# Patient Record
Sex: MALE | Race: WHITE | NOT HISPANIC OR LATINO | Employment: FULL TIME | URBAN - METROPOLITAN AREA
[De-identification: names, ages, dates, MRNs, and addresses within clinical notes are randomized per-mention and may not be internally consistent; named-entity substitution may affect disease eponyms.]

---

## 2024-09-23 PROCEDURE — 93005 ELECTROCARDIOGRAM TRACING: CPT

## 2024-09-23 PROCEDURE — 99285 EMERGENCY DEPT VISIT HI MDM: CPT | Mod: 25

## 2024-09-24 ENCOUNTER — HOSPITAL ENCOUNTER (EMERGENCY)
Facility: CLINIC | Age: 19
Discharge: HOME | End: 2024-09-24
Attending: EMERGENCY MEDICINE | Admitting: EMERGENCY MEDICINE
Payer: COMMERCIAL

## 2024-09-24 ENCOUNTER — APPOINTMENT (OUTPATIENT)
Dept: GENERAL RADIOLOGY | Facility: CLINIC | Age: 19
End: 2024-09-24
Attending: EMERGENCY MEDICINE
Payer: COMMERCIAL

## 2024-09-24 ENCOUNTER — APPOINTMENT (OUTPATIENT)
Dept: CT IMAGING | Facility: CLINIC | Age: 19
End: 2024-09-24
Attending: EMERGENCY MEDICINE
Payer: COMMERCIAL

## 2024-09-24 VITALS
RESPIRATION RATE: 18 BRPM | TEMPERATURE: 97.9 F | HEART RATE: 78 BPM | OXYGEN SATURATION: 98 % | WEIGHT: 171.08 LBS | DIASTOLIC BLOOD PRESSURE: 67 MMHG | SYSTOLIC BLOOD PRESSURE: 125 MMHG

## 2024-09-24 DIAGNOSIS — R07.9 ACUTE CHEST PAIN: ICD-10-CM

## 2024-09-24 DIAGNOSIS — I45.10 INCOMPLETE RBBB: ICD-10-CM

## 2024-09-24 LAB
ANION GAP SERPL CALCULATED.3IONS-SCNC: 13 MMOL/L (ref 7–15)
ATRIAL RATE - MUSE: 86 BPM
BASOPHILS # BLD AUTO: 0.1 10E3/UL (ref 0–0.2)
BASOPHILS NFR BLD AUTO: 1 %
BUN SERPL-MCNC: 13.2 MG/DL (ref 6–20)
CALCIUM SERPL-MCNC: 9.3 MG/DL (ref 8.8–10.4)
CHLORIDE SERPL-SCNC: 100 MMOL/L (ref 98–107)
CREAT SERPL-MCNC: 0.77 MG/DL (ref 0.67–1.17)
D DIMER PPP FEU-MCNC: 0.56 UG/ML FEU (ref 0–0.5)
DIASTOLIC BLOOD PRESSURE - MUSE: NORMAL MMHG
EGFRCR SERPLBLD CKD-EPI 2021: >90 ML/MIN/1.73M2
EOSINOPHIL # BLD AUTO: 0.1 10E3/UL (ref 0–0.7)
EOSINOPHIL NFR BLD AUTO: 1 %
ERYTHROCYTE [DISTWIDTH] IN BLOOD BY AUTOMATED COUNT: 13.5 % (ref 10–15)
GLUCOSE SERPL-MCNC: 106 MG/DL (ref 70–99)
HCO3 SERPL-SCNC: 23 MMOL/L (ref 22–29)
HCT VFR BLD AUTO: 43.4 % (ref 40–53)
HGB BLD-MCNC: 13.9 G/DL (ref 13.3–17.7)
HOLD SPECIMEN: NORMAL
HOLD SPECIMEN: NORMAL
IMM GRANULOCYTES # BLD: 0 10E3/UL
IMM GRANULOCYTES NFR BLD: 0 %
INTERPRETATION ECG - MUSE: NORMAL
LYMPHOCYTES # BLD AUTO: 3.1 10E3/UL (ref 0.8–5.3)
LYMPHOCYTES NFR BLD AUTO: 35 %
MCH RBC QN AUTO: 26.2 PG (ref 26.5–33)
MCHC RBC AUTO-ENTMCNC: 32 G/DL (ref 31.5–36.5)
MCV RBC AUTO: 82 FL (ref 78–100)
MONOCYTES # BLD AUTO: 0.6 10E3/UL (ref 0–1.3)
MONOCYTES NFR BLD AUTO: 7 %
NEUTROPHILS # BLD AUTO: 5 10E3/UL (ref 1.6–8.3)
NEUTROPHILS NFR BLD AUTO: 56 %
NRBC # BLD AUTO: 0 10E3/UL
NRBC BLD AUTO-RTO: 0 /100
P AXIS - MUSE: 63 DEGREES
PLATELET # BLD AUTO: 321 10E3/UL (ref 150–450)
POTASSIUM SERPL-SCNC: 3.8 MMOL/L (ref 3.4–5.3)
PR INTERVAL - MUSE: 148 MS
QRS DURATION - MUSE: 98 MS
QT - MUSE: 348 MS
QTC - MUSE: 416 MS
R AXIS - MUSE: 84 DEGREES
RBC # BLD AUTO: 5.31 10E6/UL (ref 4.4–5.9)
SODIUM SERPL-SCNC: 136 MMOL/L (ref 135–145)
SYSTOLIC BLOOD PRESSURE - MUSE: NORMAL MMHG
T AXIS - MUSE: 52 DEGREES
TROPONIN T SERPL HS-MCNC: <6 NG/L
VENTRICULAR RATE- MUSE: 86 BPM
WBC # BLD AUTO: 8.9 10E3/UL (ref 4–11)

## 2024-09-24 PROCEDURE — 250N000011 HC RX IP 250 OP 636: Performed by: EMERGENCY MEDICINE

## 2024-09-24 PROCEDURE — 80048 BASIC METABOLIC PNL TOTAL CA: CPT | Performed by: STUDENT IN AN ORGANIZED HEALTH CARE EDUCATION/TRAINING PROGRAM

## 2024-09-24 PROCEDURE — 71275 CT ANGIOGRAPHY CHEST: CPT

## 2024-09-24 PROCEDURE — 250N000013 HC RX MED GY IP 250 OP 250 PS 637: Performed by: EMERGENCY MEDICINE

## 2024-09-24 PROCEDURE — 85025 COMPLETE CBC W/AUTO DIFF WBC: CPT | Performed by: STUDENT IN AN ORGANIZED HEALTH CARE EDUCATION/TRAINING PROGRAM

## 2024-09-24 PROCEDURE — 84484 ASSAY OF TROPONIN QUANT: CPT | Performed by: STUDENT IN AN ORGANIZED HEALTH CARE EDUCATION/TRAINING PROGRAM

## 2024-09-24 PROCEDURE — 36415 COLL VENOUS BLD VENIPUNCTURE: CPT | Performed by: STUDENT IN AN ORGANIZED HEALTH CARE EDUCATION/TRAINING PROGRAM

## 2024-09-24 PROCEDURE — 85379 FIBRIN DEGRADATION QUANT: CPT | Performed by: EMERGENCY MEDICINE

## 2024-09-24 PROCEDURE — 71046 X-RAY EXAM CHEST 2 VIEWS: CPT

## 2024-09-24 RX ORDER — IBUPROFEN 600 MG/1
600 TABLET, FILM COATED ORAL ONCE
Status: COMPLETED | OUTPATIENT
Start: 2024-09-24 | End: 2024-09-24

## 2024-09-24 RX ORDER — IOPAMIDOL 755 MG/ML
61 INJECTION, SOLUTION INTRAVASCULAR ONCE
Status: COMPLETED | OUTPATIENT
Start: 2024-09-24 | End: 2024-09-24

## 2024-09-24 RX ADMIN — IOPAMIDOL 61 ML: 755 INJECTION, SOLUTION INTRAVENOUS at 06:05

## 2024-09-24 RX ADMIN — IBUPROFEN 600 MG: 600 TABLET, FILM COATED ORAL at 05:52

## 2024-09-24 ASSESSMENT — ACTIVITIES OF DAILY LIVING (ADL)
ADLS_ACUITY_SCORE: 35
ADLS_ACUITY_SCORE: 33
ADLS_ACUITY_SCORE: 35

## 2024-09-24 ASSESSMENT — COLUMBIA-SUICIDE SEVERITY RATING SCALE - C-SSRS
1. IN THE PAST MONTH, HAVE YOU WISHED YOU WERE DEAD OR WISHED YOU COULD GO TO SLEEP AND NOT WAKE UP?: NO
6. HAVE YOU EVER DONE ANYTHING, STARTED TO DO ANYTHING, OR PREPARED TO DO ANYTHING TO END YOUR LIFE?: NO
2. HAVE YOU ACTUALLY HAD ANY THOUGHTS OF KILLING YOURSELF IN THE PAST MONTH?: NO

## 2024-09-24 NOTE — ED TRIAGE NOTES
Patient states that he's been having chest pain since about 11pm along with SOB. Denies any trauma, or excessive caffeine intake.States pain is worse with exacerbation. ABCs intact. VSS.

## 2024-09-24 NOTE — ED PROVIDER NOTES
Emergency Department Note      History of Present Illness     Chief Complaint  Chest Pain    EVELYN Somers is a 19 year old male who presents to the ED with his partner for evaluation of chest pain. He reports upper left sided chest pain since last night when he was working along with shortness of breath.  Patient works laying asphalt, and after using a shovel a couple of times, noted the onset of left-sided chest discomfort.  This prompted concern and decision to present to the ED for further evaluation.  He denies ever experiencing pain similar to this in the past.  He denies any associated prior cardiac history nor any family history of premature cardiac disease or sudden cardiac death.  Is no underlying history of high blood pressure, high cholesterol or diabetes.  No history of drug use.  No prior history of DVT nor PE.  Symptoms do not change based on how he is sitting or laying.    Independent Historian  None    Review of External Notes  None  Past Medical History   Medical History and Problem List  ADHD  Varicocele     Medications  Zanaflex   Accutane   Physical Exam   Patient Vitals for the past 24 hrs:   BP Temp Temp src Pulse Resp SpO2 Weight   09/24/24 0415 125/67 -- -- 78 -- 98 % --   09/24/24 0005 (!) 143/84 97.9  F (36.6  C) Temporal 101 18 100 % 77.6 kg (171 lb 1.2 oz)     Physical Exam  General:              Well-nourished              Speaking in full sentences  Eyes:              Conjunctiva without injection or scleral icterus  ENT:              Moist mucous membranes              Nares patent              Pinnae normal  Neck:              Full ROM              No stiffness appreciated  Resp:              Lungs CTAB              No crackles, wheezing or audible rubs              Good air movement  CV:                    Normal rate, regular rhythm              S1 and S2 present              No murmur, gallop or rub  GI:              BS present              Abdomen soft without  distention              Non-tender to light and deep palpation              No guarding or rebound tenderness  Skin:              Warm, dry, well perfused              No rashes or open wounds on exposed skin  MSK:              Moves all extremities              Tenderness to palpation over anterior chest wall              Extension of the shoulders exacerbates pain              Performing a shoveling movement exacerbates his pain  Neuro:              Alert              Answers questions appropriately              Moves all extremities equally              Gait stable  Psych:              Normal affect, normal mood    Diagnostics   Lab Results   Labs Ordered and Resulted from Time of ED Arrival to Time of ED Departure   BASIC METABOLIC PANEL - Abnormal       Result Value    Sodium 136      Potassium 3.8      Chloride 100      Carbon Dioxide (CO2) 23      Anion Gap 13      Urea Nitrogen 13.2      Creatinine 0.77      GFR Estimate >90      Calcium 9.3      Glucose 106 (*)    CBC WITH PLATELETS AND DIFFERENTIAL - Abnormal    WBC Count 8.9      RBC Count 5.31      Hemoglobin 13.9      Hematocrit 43.4      MCV 82      MCH 26.2 (*)     MCHC 32.0      RDW 13.5      Platelet Count 321      % Neutrophils 56      % Lymphocytes 35      % Monocytes 7      % Eosinophils 1      % Basophils 1      % Immature Granulocytes 0      NRBCs per 100 WBC 0      Absolute Neutrophils 5.0      Absolute Lymphocytes 3.1      Absolute Monocytes 0.6      Absolute Eosinophils 0.1      Absolute Basophils 0.1      Absolute Immature Granulocytes 0.0      Absolute NRBCs 0.0     D DIMER QUANTITATIVE - Abnormal    D-Dimer Quantitative 0.56 (*)    TROPONIN T, HIGH SENSITIVITY - Normal    Troponin T, High Sensitivity <6       Imaging  CT Chest Pulmonary Embolism w Contrast   Final Result   IMPRESSION:   1.  No pulmonary embolism.      Chest XR,  PA & LAT   Final Result   IMPRESSION: Negative chest.        Report per radiology    EKG   ECG results from  09/24/24   EKG 12 lead     Value    Systolic Blood Pressure     Diastolic Blood Pressure     Ventricular Rate 86    Atrial Rate 86    HI Interval 148    QRS Duration 98        QTc 416    P Axis 63    R AXIS 84    T Axis 52    Interpretation ECG      Sinus rhythm  Incomplete right bundle branch block  Borderline ECG  No previous ECGs available  Read at 0529       Independent Interpretation  CXR: No pneumonia or pneumothorax.  ED Course    Medications Administered  Medications   ibuprofen (ADVIL/MOTRIN) tablet 600 mg (600 mg Oral $Given 9/24/24 0552)   iopamidol (ISOVUE-370) solution 61 mL (61 mLs Intravenous $Given 9/24/24 0605)   sodium chloride (PF) 0.9% PF flush 80 mL (80 mLs Intravenous $Given 9/24/24 0607)     Procedures  Procedures     Discussion of Management  None    Optional/Additional Documentation  None    ED Course  ED Course as of 09/24/24 0712   Tue Sep 24, 2024   0507 I obtained history and examined the patient as noted above.    0536 I rechecked the patient and explained findings.   0628 Patient re-checked.     Medical Decision Making / Diagnosis   CMS Diagnoses: None    MIPS  CT for PE was ordered because the patient had an abnormal d-dimer.    LIZETTE Somers is a 19-year-old male presenting to the ER for evaluation of chest pain.  VS on presentation reveal mild tachycardia though otherwise are unremarkable.  Differential diagnosis includes ACS, PE, pneumothorax, pneumonia, aortic dissection, musculoskeletal pain, among others.  History, exam, and ED course as noted above.  Certainly, musculoskeletal etiologies on the differential as patient noted the onset of pain while shoveling.  He had also has tenderness to palpation over his chest wall as well as exacerbation of symptoms with extension of his shoulders.  EKG was pursued, demonstrating sinus rhythm, incomplete right bundle branch block, though no acute ST segment elevation or depression.  No previous EKGs are available for comparison.   I discussed this incidental finding with patient and he may review this further with his PCP and consider outpatient echocardiogram, though do not feel this needs to be done emergently from the ER.  Given his shortness of breath, PE considered, D-dimer did return mildly elevated for which CT pursued.  CT chest fortunately negative for PE, pneumothorax, pneumonia, or acute aortic abnormality.  His high-sensitivity troponin has also returned undetectable and given the above history and evaluation, low suspicion for ACS.  Results and clinical impression discussed with patient.  With reasonable clinical certainty I feel he can safely be discharged from the ER with close outpatient follow-up.  I recommended follow-up with primary care provider in 2 to 3 days for recheck.  Return to ER with worsening pain, shortness of breath, fevers, chills or any other concerns.    Disposition  The patient was discharged.     ICD-10 Codes:    ICD-10-CM    1. Acute chest pain  R07.9       2. Incomplete RBBB  I45.10            Scribe Disclosure:  IKaila, am serving as a scribe at 5:18 AM on 9/24/2024 to document services personally performed by Robbie Salgado MD based on my observations and the provider's statements to me.      Robbie Salgado MD  09/24/24 0724

## 2024-09-24 NOTE — DISCHARGE INSTRUCTIONS
Please monitor symptoms closely.  I recommend a trial of anti-inflammatory medications including Tylenol/ibuprofen.    Avoid strenuous activities until symptoms improve.    Follow-up with primary care provider in 2 to 3 days for recheck and to review further evaluation of your EKG findings.    Return to the ER if you develop worsening pain, shortness of breath, fevers, chills or other concerns.    Discharge Instructions  Chest Pain    You have been seen today for chest pain or discomfort.  At this time, your provider has found no signs that your chest pain is due to a serious or life-threatening condition, (or you have declined more testing and/or admission to the hospital). However, sometimes there is a serious problem that does not show up right away. Your evaluation today may not be complete and you may need further testing and evaluation.     Generally, every Emergency Department visit should have a follow-up clinic visit with either a primary or a specialty clinic/provider. Please follow-up as instructed by your emergency provider today.  Return to the Emergency Department if:  Your chest pain changes, gets worse, starts to happen more often, or comes with less activity.  You are newly short of breath.  You get very weak or tired.  You pass out or faint.  You have any new symptoms, like fever, cough, numb legs, or you cough up blood.  You have anything else that worries you.    Until you follow-up with your regular provider, please do the following:  Take one aspirin daily unless you have an allergy or are told not to by your provider.  If a stress test appointment has been made, go to the appointment.  If you have questions, contact your regular provider.  Follow-up with your regular provider/clinic as directed; this is very important.    If you were given a prescription for medicine here today, be sure to read all of the information (including the package insert) that comes with your prescription.  This will  include important information about the medicine, its side effects, and any warnings that you need to know about.  The pharmacist who fills the prescription can provide more information and answer questions you may have about the medicine.  If you have questions or concerns that the pharmacist cannot address, please call or return to the Emergency Department.       Remember that you can always come back to the Emergency Department if you are not able to see your regular provider in the amount of time listed above, if you get any new symptoms, or if there is anything that worries you.